# Patient Record
Sex: FEMALE | Race: WHITE
[De-identification: names, ages, dates, MRNs, and addresses within clinical notes are randomized per-mention and may not be internally consistent; named-entity substitution may affect disease eponyms.]

---

## 2020-06-15 ENCOUNTER — HOSPITAL ENCOUNTER (INPATIENT)
Dept: HOSPITAL 46 - FBC | Age: 35
LOS: 1 days | Discharge: HOME | End: 2020-06-16
Attending: OBSTETRICS & GYNECOLOGY | Admitting: OBSTETRICS & GYNECOLOGY
Payer: COMMERCIAL

## 2020-06-15 VITALS — HEIGHT: 65 IN | WEIGHT: 163.14 LBS | BODY MASS INDEX: 27.18 KG/M2

## 2020-06-15 DIAGNOSIS — F41.9: ICD-10-CM

## 2020-06-15 DIAGNOSIS — F10.21: ICD-10-CM

## 2020-06-15 DIAGNOSIS — O48.0: Primary | ICD-10-CM

## 2020-06-15 DIAGNOSIS — Z3A.41: ICD-10-CM

## 2020-06-15 DIAGNOSIS — F17.290: ICD-10-CM

## 2020-06-15 DIAGNOSIS — Z79.899: ICD-10-CM

## 2020-06-15 DIAGNOSIS — D64.9: ICD-10-CM

## 2020-06-15 DIAGNOSIS — F19.11: ICD-10-CM

## 2020-06-15 PROCEDURE — 10H07YZ INSERTION OF OTHER DEVICE INTO PRODUCTS OF CONCEPTION, VIA NATURAL OR ARTIFICIAL OPENING: ICD-10-PCS | Performed by: OBSTETRICS & GYNECOLOGY

## 2020-06-15 PROCEDURE — 0UQGXZZ REPAIR VAGINA, EXTERNAL APPROACH: ICD-10-PCS | Performed by: OBSTETRICS & GYNECOLOGY

## 2020-06-15 PROCEDURE — A9270 NON-COVERED ITEM OR SERVICE: HCPCS

## 2020-06-15 PROCEDURE — 3E0R3BZ INTRODUCTION OF ANESTHETIC AGENT INTO SPINAL CANAL, PERCUTANEOUS APPROACH: ICD-10-PCS | Performed by: NURSE ANESTHETIST, CERTIFIED REGISTERED

## 2020-06-15 PROCEDURE — 3E0P7VZ INTRODUCTION OF HORMONE INTO FEMALE REPRODUCTIVE, VIA NATURAL OR ARTIFICIAL OPENING: ICD-10-PCS | Performed by: OBSTETRICS & GYNECOLOGY

## 2020-06-15 PROCEDURE — 00HU33Z INSERTION OF INFUSION DEVICE INTO SPINAL CANAL, PERCUTANEOUS APPROACH: ICD-10-PCS | Performed by: NURSE ANESTHETIST, CERTIFIED REGISTERED

## 2020-06-15 NOTE — PR
[facility]
                                    [address]
                                  [place], [place]  [address]
_________________________________________________________________________________________
                                                                 Signed   
 
 
===================================
Progress Notes IP
===================================
Datetime Report Generated by CPN: 06/15/2020 19:23
   
PROGRESS NOTES:  [id number]
Impression:  Normal progression of labor
Procedures:  Intrauterine Pressure Catheter; Fetal Scalp Electrode
Plan:  Anticipate Vaginal Delivery
VITAL SIGNS:  [id number]
Vital Signs:  Reviewed; Within Normal Limits
EXAM:  [id number]
Dilatation:  10.0
Effacement:  100
Station:  0
Uterine Contractions:  every 2-4 minutes
MEMBRANES:  [id number]
Membrane Status:  Ruptured
Amniotic Fluid Color:  Clear
ROM Note:  SROM after previous exam, fluid with slight blood tinge
Comments:  Comfortable with Epidural.  Will try pushing.  Continue with Nasal O2
Fetus A:  [id number]
FHR Baseline:  150
Variability:  Moderate 6-25bpm
Accelerations:  10X10
Presentation:  Vertex
Comments on Fetus A:  variability changes form moderate to decreased and back again
Fetus B:  [id number]
Signing Physician:  Abel Calderón MD
 
 
Copies:                                
~
 
 
 
 
 
 
 
 
 
 
*Electronically Signed*  06/15/20  1923  ABEL CALDERÓN MD      
                                                                       
_________________________________________________________________________________________
PATIENT NAME:     ELLY SHINE LOCO             
MEDICAL RECORD #: [id number]                     PROGRESS NOTE                 
          ACCT #: [id number]  
DATE OF BIRTH:   [birth year]                                       
PHYSICIAN:   ABEL CALDERÓN MD             RPT #: [id number]
REPORT IS CONFIDENTIAL AND NOT TO BE RELEASED WITHOUT AUTHORIZATION

## 2020-06-15 NOTE — PR
[facility]
                                    [address] [place], [place]  [address]
_________________________________________________________________________________________
                                                                 Signed   
 
 
===================================
Progress Notes IP
===================================
Datetime Report Generated by CPN: 06/15/2020 14:57
   
PROGRESS NOTES:  [id number]
Impression:  Normal progression of labor
Procedures:  Intrauterine Pressure Catheter; Fetal Scalp Electrode
Plan:  Continue present management; Anticipate Vaginal Delivery
VITAL SIGNS:  [id number]
Vital Signs:  Reviewed; Within Normal Limits
EXAM:  [id number]
Dilatation:  6.0
Effacement:  100
Station:  -2
Uterine Contractions:  every 2-4 minutes
MEMBRANES:  [id number]
Membrane Status:  Ruptured
Amniotic Fluid Color:  Clear
ROM Note:  SROM after previous exam, fluid with slight blood tinge
Comments:  Comfortable with Epidural, havign decreaed variability with occasional late
   decels, givne O2 by NC.  Now normal variability with some accelerations and continuing
   to make progress; will continue close monitoring, trying different positions.
Fetus A:  [id number]
FHR Baseline:  145
Variability:  Moderate 6-25bpm
Accelerations:  10X10
Presentation:  Vertex
Comments on Fetus A:  prior decreased variability and occasional late decels
Fetus B:  [id number]
Signing Physician:  Georgette Calderón MD
 
 
Copies:                                
~
 
 
 
 
 
 
 
 
*Electronically Signed*  06/15/20  1457  GEORGETTE CALDERÓN MD      
                                                                       
_________________________________________________________________________________________
PATIENT NAME:     ELLY SHINE             
MEDICAL RECORD #: [id number]                     PROGRESS NOTE                 
          ACCT #: [id number]  
DATE OF BIRTH:   [birth year]                                       
PHYSICIAN:   GEORGETTE CALDERÓN MD             RPT #: [id number]
REPORT IS CONFIDENTIAL AND NOT TO BE RELEASED WITHOUT AUTHORIZATION

## 2020-06-15 NOTE — PR
[facility]
                                    [address]
                                  [place], [place]  [address]
_________________________________________________________________________________________
                                                                 Signed   
 
 
===================================
Progress Notes IP
===================================
Datetime Report Generated by CPN: 06/15/2020 13:17
   
PROGRESS NOTES:  [id number]
Procedures:  Intrauterine Pressure Catheter; Fetal Scalp Electrode
Plan:  Continue present management; Anticipate Vaginal Delivery
VITAL SIGNS:  J0701305
Vital Signs:  Reviewed; Within Normal Limits
EXAM:  [id number]
Dilatation:  4.0
Effacement:  100
Station:  -2
Uterine Contractions:  every 2-3 minutes
MEMBRANES:  [id number]
Membrane Status:  Bulging
Amniotic Fluid Color:  Bloody
ROM Note:  SROM after previous exam, fluid with slight blood tinge
Comments:  Comfortable with Ep'idural.  Some decreased variabilituy occasional decels, so
   internal monitors applied and will continue close monitoring
Fetus A:  [id number]
FHR Baseline:  145
Variability:  Moderate 6-25bpm
Accelerations:  10X10
Presentation:  Vertex
Fetus B:  [id number]
Signing Physician:  Abel Calderón MD
 
 
Copies:                                
~
 
 
 
 
 
 
 
 
 
 
 
*Electronically Signed*  06/15/20  1317  ABEL CALDERÓN MD      
                                                                       
_________________________________________________________________________________________
PATIENT NAME:     ELLY SHINE LOCO             
MEDICAL RECORD #: [id number]                     PROGRESS NOTE                 
          ACCT #: [id number]  
DATE OF BIRTH:   [birth year]                                       
PHYSICIAN:   ABEL CALDERÓN MD             RPT #: [id number]
REPORT IS CONFIDENTIAL AND NOT TO BE RELEASED WITHOUT AUTHORIZATION

## 2020-06-15 NOTE — PR
[facility]
                                    [address] [facility]
                                  [place], [place]  [address]
_________________________________________________________________________________________
                                                                 Signed   
 
 
===================================
Progress Notes IP
===================================
Datetime Report Generated by CPN: 06/15/2020 19:58
   
PROGRESS NOTES:  [id number]
Impression:  Normal progression of labor
Procedures:  Intrauterine Pressure Catheter; Fetal Scalp Electrode
Plan:  Anticipate Vaginal Delivery
VITAL SIGNS:  Z6104331
Vital Signs:  Reviewed; Within Normal Limits
EXAM:  [id number]
Dilatation:  10.0
Effacement:  100
Station:  0
Uterine Contractions:  every 2-4 minutes
MEMBRANES:  [id number]
Membrane Status:  Ruptured
Amniotic Fluid Color:  Clear
ROM Note:  SROM after previous exam, fluid with slight blood tinge
Comments:  Pushing well; will continue
Fetus A:  [id number]
FHR Baseline:  150
Variability:  Minimal - Undetectable to <5bpm
Accelerations:  15X15
Presentation:  Vertex
Comments on Fetus A:  variability changes form moderate to decreased and back again
Fetus B:  [id number]
Signing Physician:  Abel Calderón MD
 
 
Copies:                                
~
 
 
 
 
 
 
 
 
 
 
*Electronically Signed*  06/15/20  1958  ABEL CALDERÓN MD      
                                                                       
_________________________________________________________________________________________
PATIENT NAME:     ELLY SHINE LOCO             
MEDICAL RECORD #: [id number]                     PROGRESS NOTE                 
          ACCT #: [id number]  
DATE OF BIRTH:   [birth year]                                       
PHYSICIAN:   ABEL CALDERÓN MD             RPT #: [id number]
REPORT IS CONFIDENTIAL AND NOT TO BE RELEASED WITHOUT AUTHORIZATION

## 2020-06-16 NOTE — NUR
Pt resting in bed, denies c/o at this time.  Infant in basinet, on back, eyes
closed, respirations even and unlabored, skin pink.  Pt denies pt care needs
at thist time.

## 2020-06-16 NOTE — PR
[facility]
                                    [address]
                                  [place]  [address]
_________________________________________________________________________________________
                                                                 Signed   
 
 
===================================
PP Progress Notes
===================================
Datetime Report Generated by CPN: 06/16/2020 13:50
   
SUBJECTIVE:  [id number]
Pain:  Within normal limits
Nausea/Vomiting:  Denies
Vital Signs:  E8240569
Vital Signs:  Reviewed; Within Normal Limits
Notable Details:  PP Hgb/Hct = 10.8/31.6
POSTPARTUM EXAM:  [id number]
Abdomen/Uterus:  Normal
Lochia:  Normal
Extremities:  Normal
IMPRESSION/PLAN/PROCEDURES:  I8016038
Impression:  Normal postpartum progression
Plan:  Discharge
Procedures:  None
Progress Notes:  Doing well, without complaitn, wants to go home today
Signing Physician:  Georgette Calderón MD
 
 
Copies:                                
~
 
 
 
 
 
 
 
 
 
 
 
 
 
 
 
 
 
 
*Electronically Signed*  06/16/20  1350  GEORGETTE CALDERÓN MD      
                                                                       
_________________________________________________________________________________________
PATIENT NAME:     ELLY SHINE             
MEDICAL RECORD #: [id number]                     PROGRESS NOTE                 
          ACCT #: [id number]  
DATE OF BIRTH:   [birth year]                                       
PHYSICIAN:   GEORGETTE CALDERÓN MD             RPT #: [id number]
REPORT IS CONFIDENTIAL AND NOT TO BE RELEASED WITHOUT AUTHORIZATION

## 2020-06-16 NOTE — NUR
Pt in bed with infant in arms, infant eyes closed, respiration even and
unlabored.  Pt denies c/o at this time, adult male in room with patient and
infant.